# Patient Record
Sex: MALE | Race: OTHER | NOT HISPANIC OR LATINO | URBAN - METROPOLITAN AREA
[De-identification: names, ages, dates, MRNs, and addresses within clinical notes are randomized per-mention and may not be internally consistent; named-entity substitution may affect disease eponyms.]

---

## 2018-01-25 ENCOUNTER — EMERGENCY (EMERGENCY)
Facility: HOSPITAL | Age: 43
LOS: 1 days | Discharge: HOME | End: 2018-01-25
Attending: EMERGENCY MEDICINE | Admitting: EMERGENCY MEDICINE
Payer: MEDICAID

## 2018-01-25 VITALS
TEMPERATURE: 99 F | SYSTOLIC BLOOD PRESSURE: 132 MMHG | OXYGEN SATURATION: 100 % | DIASTOLIC BLOOD PRESSURE: 82 MMHG | RESPIRATION RATE: 20 BRPM | HEART RATE: 73 BPM

## 2018-01-25 PROCEDURE — 99284 EMERGENCY DEPT VISIT MOD MDM: CPT

## 2018-01-25 PROCEDURE — 73610 X-RAY EXAM OF ANKLE: CPT | Mod: 26,RT

## 2018-01-25 RX ORDER — OXYCODONE AND ACETAMINOPHEN 5; 325 MG/1; MG/1
1 TABLET ORAL ONCE
Qty: 0 | Refills: 0 | Status: DISCONTINUED | OUTPATIENT
Start: 2018-01-25 | End: 2018-01-25

## 2018-01-25 RX ADMIN — OXYCODONE AND ACETAMINOPHEN 1 TABLET(S): 5; 325 TABLET ORAL at 12:23

## 2018-01-25 RX ADMIN — OXYCODONE AND ACETAMINOPHEN 1 TABLET(S): 5; 325 TABLET ORAL at 13:35

## 2018-01-25 NOTE — CONSULT NOTE ADULT - SUBJECTIVE AND OBJECTIVE BOX
CC:   HPI:   42yMale     Review of systems: As per HPI, otherwise negative.     PAST MEDICAL & SURGICAL HISTORY:  No pertinent past medical history    Family History: FAMILY HISTORY:      Medications: MEDICATIONS  (STANDING):    MEDICATIONS  (PRN):      T(C): 37.2 (01-25-18 @ 09:55), Max: 37.2 (01-25-18 @ 09:55)  HR: 73 (01-25-18 @ 09:55) (73 - 73)  BP: 132/82 (01-25-18 @ 09:55) (132/82 - 132/82)  RR: 20 (01-25-18 @ 09:55) (20 - 20)  SpO2: 100% (01-25-18 @ 09:55) (100% - 100%)  Wt(kg): --            EXAM:  Awake, Alert and in no acute distress      Imaging      A/P: This is a 42y Male who presents today with   -   -  -    Omar Hewitt MD CC: R leg pain    HPI: 41 y/o Male 10 days s/p ORIF of R ankle at [facility] presenting to the ED with increased pain     Review of systems: As per HPI, otherwise negative.     PAST MEDICAL & SURGICAL HISTORY:  No pertinent past medical history    Family History: FAMILY HISTORY:      Medications: MEDICATIONS  (STANDING):    MEDICATIONS  (PRN):      T(C): 37.2 (01-25-18 @ 09:55), Max: 37.2 (01-25-18 @ 09:55)  HR: 73 (01-25-18 @ 09:55) (73 - 73)  BP: 132/82 (01-25-18 @ 09:55) (132/82 - 132/82)  RR: 20 (01-25-18 @ 09:55) (20 - 20)  SpO2: 100% (01-25-18 @ 09:55) (100% - 100%)  Wt(kg): --            EXAM:  Awake, Alert and in no acute distress      Imaging      A/P: This is a 42y Male who presents today with   -   -  -    Omar Hewitt MD CC: R leg pain    HPI: 41 y/o Male 10 days s/p ORIF of R ankle at [facility] presenting to the ED with increased pain of the RLE. Patient has been NWB in a short leg cast since the operation and has an appointment to see his surgeon on 1/31. Says the pain feels like pressure and thinks it is due to cast being too tight. Denies numbness, paresthesias. Denies fever, chills, nausea, vomiting.     Review of systems: As per HPI, otherwise negative.     PAST MEDICAL & SURGICAL HISTORY:  No pertinent past medical history aside from that stated in HPI    Family History: FAMILY HISTORY:  Noncontributory    T(C): 37.2 (01-25-18 @ 09:55), Max: 37.2 (01-25-18 @ 09:55)  HR: 73 (01-25-18 @ 09:55) (73 - 73)  BP: 132/82 (01-25-18 @ 09:55) (132/82 - 132/82)  RR: 20 (01-25-18 @ 09:55) (20 - 20)  SpO2: 100% (01-25-18 @ 09:55) (100% - 100%)    EXAM:  Awake, Alert and in no acute distress  RLE: in short leg cast, able to wiggle toes, cap refill <2 sec  Cast bivalved and removed  No ankle effusion appreciated  Medial and lateral incisions c/d/i with simple interrupted sutures in place  Minimal tenderness to palpation over both malleoli  Calf non-tender, compartments soft  EHL/GS/TA intact  Murphy/Sa/SP/DP sensation intact  DP palpable, WWP distally    Ankle wrapped with webril and bivalved cast replaced and secured in place with two ACE wraps  Patient stated at pain was relieved when cast was opened    Imaging:  X-ray R ankle  lateral plate with 6 screws (1 across syndesmosis) and medial screw all in place  Fractures adequately reduced  No soft tissue swelling or effusion noted    A/P: This is a 42y Male who presents today with increased RLE pain 10 days s/p ORIF of R ankle trimalleolar fracture at [facility]    - cast bivalved, continue to wear and remain NWB RLE  - f/u with surgeon at [place] as planned on 1/31  - Pain control  - Elevate to reduce swelling    Patient seen and discussed with Dr. Hansa Hewitt MD

## 2018-01-25 NOTE — ED ADULT NURSE REASSESSMENT NOTE - NS ED NURSE REASSESS COMMENT FT1
RW pt right lower leg cast removed by Ortho.  and D/C with instructions uses crutches.     Vidya Wills RN

## 2018-01-25 NOTE — ED PROVIDER NOTE - PHYSICAL EXAMINATION
General: well appearing male in no acute distress   Respiratory: normal work of breathing, lungs clear to auscultation bilaterally   Cardiac: regular rate and rhythm   Abdomen: soft, non-tender, no CVA tenderness   MSK: right lower extremity in cast, no tenderness to palpation of calf, warm, 2sec cap refill   Neuro: A&Ox3, no sensory deficits of lower extremities

## 2018-01-25 NOTE — ED PROVIDER NOTE - PROGRESS NOTE DETAILS
spoke with ortho who requested xrays of foot. will eval patient in ED. - resident Rodríguez Siddiqui patient seen by ortho who did bi-valve. patient reports symptoms have improved. spoke with ortho, patient to follow-up as out patient. resident Rodríguez Siddiqui

## 2018-01-25 NOTE — ED ADULT TRIAGE NOTE - CHIEF COMPLAINT QUOTE
c/o pain to right pt had surgery one week ago at [facility]. for fractured ankle pt ankle in cast c/o pain swelling to ankle

## 2018-01-25 NOTE — ED PROVIDER NOTE - ATTENDING CONTRIBUTION TO CARE
YOLY MENDOZA, MD: 43 yo M, with PSHx of ankle fracture about 1 week ago s/p ORIF by unknown surgeon presenting with right ankle tightness/pain performed at [facility].  Pain has been controlled with oxycodone x 4 days, but ran out and started Motrin which hasn't helped with pain.  Patient denies any calf pain, numbness or tingling and c/o pain over the inner ankle.  Denies chest pain or SOB.   No F/C.  NWB since injury.  YOLY FAITH, ATTENDING NOTE:  Patient is awake and alert and in no acute distress.  Normocephalic/atraumatic.  Auricles are normal.  Neck supple.  Lungs CTAB, no wheeze, no rhonchi,  no rales.  Heart is regular rate and rhythm.  Abdomen is soft, not distended +BS.  Moving all 3 extremities with RLL in a short leg cast.   Neurologically grossly intact, sensation intake proximal and distal to cast.  Cap refill <2s.  Affect is appropriate.   DR. FAITH, ATTENDING MD-  I performed a face to face bedside interview with patient regarding history of present illness, review of symptoms and past medical history. I completed an independent physical exam.  I have discussed patient's plan of care with the resident.   I agree with note as stated above, having amended the EMR as needed to reflect my findings. I have discussed the assessment and plan of care.  This includes during the time I functioned as the attending physician for this patient.

## 2018-01-25 NOTE — ED PROVIDER NOTE - OBJECTIVE STATEMENT
42M, no significant PMH presenting with right ankle tightness. Patient reports he had right ankle surgery performed at [facility] one week ago because of a fracture. Pain has been controlled with oxycodone and Motrin at home. Last night the right ankle began to feel tight and uncomfortable. Symptoms not alleviated with Motrin. Patient denies any calf pain, numbness or tingling of lower extremities, chest pain or difficulty breathing, nausea, vomiting or abdominal pain. No use of blood thinners, has kept foot elevated and using crutches to ambulate.

## 2018-01-25 NOTE — ED PROVIDER NOTE - MEDICAL DECISION MAKING DETAILS
42M, presenting one week after right ankle surgery presenting with right ankle tightness. low concern for DVT as no calf pain. low concern for compartment syndrome. 42M, presenting one week after right ankle surgery presenting with right ankle tightness. low concern for DVT as no calf pain. low concern for compartment syndrome. will consult ortho for imaging vs bi-valve case. will reassess. 42 M, presenting one week after right ankle surgery presenting with right ankle tightness.   Analgesia PRN.  Will consult ortho for imaging vs bi-valve case. will reassess.

## 2018-01-25 NOTE — CONSULT NOTE ADULT - ATTENDING COMMENTS
I agree with the above note on this patient. All pertinent films have been reviewed. Please refer to clinical documentation of the history, physical examinations, data summary, and both assessment and plan as documented above and with which I agree.    Dain Knott MD  Attending Orthopedic Surgeon

## 2018-01-29 ENCOUNTER — EMERGENCY (EMERGENCY)
Facility: HOSPITAL | Age: 43
LOS: 1 days | Discharge: HOME | End: 2018-01-29
Attending: EMERGENCY MEDICINE | Admitting: EMERGENCY MEDICINE
Payer: MEDICAID

## 2018-01-29 VITALS
HEART RATE: 85 BPM | RESPIRATION RATE: 18 BRPM | SYSTOLIC BLOOD PRESSURE: 114 MMHG | OXYGEN SATURATION: 98 % | DIASTOLIC BLOOD PRESSURE: 76 MMHG | TEMPERATURE: 98 F

## 2018-01-29 LAB
BASOPHILS # BLD AUTO: 0.07 K/UL — SIGNIFICANT CHANGE UP (ref 0–0.2)
BASOPHILS NFR BLD AUTO: 0.7 % — SIGNIFICANT CHANGE UP (ref 0–2)
CRP SERPL-MCNC: < 5 MG/L — SIGNIFICANT CHANGE UP
EOSINOPHIL # BLD AUTO: 0.2 K/UL — SIGNIFICANT CHANGE UP (ref 0–0.5)
EOSINOPHIL NFR BLD AUTO: 2 % — SIGNIFICANT CHANGE UP (ref 0–6)
ERYTHROCYTE [SEDIMENTATION RATE] IN BLOOD: 10 MM/HR — SIGNIFICANT CHANGE UP (ref 1–15)
HCT VFR BLD CALC: 47.7 % — SIGNIFICANT CHANGE UP (ref 39–50)
HGB BLD-MCNC: 15.7 G/DL — SIGNIFICANT CHANGE UP (ref 13–17)
IMM GRANULOCYTES # BLD AUTO: 0.04 # — SIGNIFICANT CHANGE UP
IMM GRANULOCYTES NFR BLD AUTO: 0.4 % — SIGNIFICANT CHANGE UP (ref 0–1.5)
LYMPHOCYTES # BLD AUTO: 3.74 K/UL — HIGH (ref 1–3.3)
LYMPHOCYTES # BLD AUTO: 37.5 % — SIGNIFICANT CHANGE UP (ref 13–44)
MCHC RBC-ENTMCNC: 29.8 PG — SIGNIFICANT CHANGE UP (ref 27–34)
MCHC RBC-ENTMCNC: 32.9 % — SIGNIFICANT CHANGE UP (ref 32–36)
MCV RBC AUTO: 90.5 FL — SIGNIFICANT CHANGE UP (ref 80–100)
MONOCYTES # BLD AUTO: 0.66 K/UL — SIGNIFICANT CHANGE UP (ref 0–0.9)
MONOCYTES NFR BLD AUTO: 6.6 % — SIGNIFICANT CHANGE UP (ref 2–14)
NEUTROPHILS # BLD AUTO: 5.26 K/UL — SIGNIFICANT CHANGE UP (ref 1.8–7.4)
NEUTROPHILS NFR BLD AUTO: 52.8 % — SIGNIFICANT CHANGE UP (ref 43–77)
NRBC # FLD: 0 — SIGNIFICANT CHANGE UP
PLATELET # BLD AUTO: 378 K/UL — SIGNIFICANT CHANGE UP (ref 150–400)
PMV BLD: 9.6 FL — SIGNIFICANT CHANGE UP (ref 7–13)
RBC # BLD: 5.27 M/UL — SIGNIFICANT CHANGE UP (ref 4.2–5.8)
RBC # FLD: 12.6 % — SIGNIFICANT CHANGE UP (ref 10.3–14.5)
WBC # BLD: 9.97 K/UL — SIGNIFICANT CHANGE UP (ref 3.8–10.5)
WBC # FLD AUTO: 9.97 K/UL — SIGNIFICANT CHANGE UP (ref 3.8–10.5)

## 2018-01-29 PROCEDURE — 99284 EMERGENCY DEPT VISIT MOD MDM: CPT

## 2018-01-29 PROCEDURE — 73610 X-RAY EXAM OF ANKLE: CPT | Mod: 26,RT

## 2018-01-29 NOTE — CONSULT NOTE ADULT - SUBJECTIVE AND OBJECTIVE BOX
42y Male with incisional pain at sites of R ankle ORIF at [place] POD14. Patient was in the ED on 1/25/18 for right ankle pain where the cast was bivalved and the patient had pain relief. Today the patient comes in because he feels "the staples or pinching his skin". He has some incisional pain and numbness. no drainage from the wound. Pt denies new trauma and says he has been compliant with non weight bearing status. denies any fevers or chills and denies any other orthopedic injuries at this time.    PAST MEDICAL & SURGICAL HISTORY:  No pertinent past medical history    MEDICATIONS  (STANDING):    Allergies    No Known Allergies    Intolerances      Vital Signs Last 24 Hrs  T(C): 36.9 (01-29-18 @ 12:20), Max: 36.9 (01-29-18 @ 12:20)  T(F): 98.4 (01-29-18 @ 12:20), Max: 98.4 (01-29-18 @ 12:20)  HR: 85 (01-29-18 @ 12:20) (85 - 85)  BP: 114/76 (01-29-18 @ 12:20) (114/76 - 114/76)  BP(mean): --  RR: 18 (01-29-18 @ 12:20) (18 - 18)  SpO2: 98% (01-29-18 @ 12:20) (98% - 98%)    Imaging: pending imaging    Physical Exam  Gen: NAD, AAOx3  RLE: medial and lateral incisions intact, normal postoperative changes, staples in place, no bleeding, no drainage from incision, non TTP over medial and lateral malleoli no bony TTP at Knee/Foot/Toes, able to SLR, neg logroll, +EHL/FHL/TA/GS, SILT L3-S1, +DP/PT Pulses, compartments soft, no calf TTP B/L    Secondary Survey: Full ROM of unaffected extremities, SILT globally, compartments soft, no bony TTP over bony prominences, no calf TTP, able to SLR with contralateral leg, no TTP along axial spine    A/P: 42y Male POD14 from R ankle ORIF, bivalved cast on POD10, no with incisional pain and numbness  -pain control  -keep bivalved cast on clean dry intact  -rest ice elevate affected ankle  -NWB on affected ankle  -no acute concern for compartment syndrome or septic joint or surgical site infection  -FU labs and imaging  -no acute ortho intervention needed at this time  -please follow up with primary surgeon, patient states he has appointment on Wednesday  -ortho stable for DC

## 2018-01-29 NOTE — ED PROVIDER NOTE - MEDICAL DECISION MAKING DETAILS
43 y/o M w/ right ankle edema and mild erythema s/p surgery. Plan: Labs, XR, r/o infection and ortho evaluation.

## 2018-01-29 NOTE — ED PROVIDER NOTE - LOWER EXTREMITY EXAM, RIGHT
staples over medial and lateral malleolus, no discharge, appears well healing, anterior ankle w/ warmth, mild erythema and mild edema, pulses normal, sensation intact, no ROM of right ankle, but good movement in toes and knee, no calf tenderness, no calf edema

## 2018-01-29 NOTE — ED PROVIDER NOTE - PROGRESS NOTE DETAILS
SAUL jordan: pt doing well, labs reassuring and xr neg for acute findings. hardware in place. will dc with ortho follow up.

## 2018-01-29 NOTE — ED PROVIDER NOTE - ATTENDING CONTRIBUTION TO CARE
DR. GARNER, UPFRONT ATTENDING MD-  I was the attending upfront in Triage today and I performed the initial face to face bedside interview with patient regarding history of present illness, review of symptoms and past medical history. I completed an independent physical exam.  Since I was the initial provider who evalauted this patient, the history, ROS and examination was documented by me in the note.  I have signed out the follow up of any pending tests (ie. labs and/or radiological studies) to the PA.  I have discussed patient's plan of care and disposition with PA.

## 2018-01-29 NOTE — ED ADULT TRIAGE NOTE - CHIEF COMPLAINT QUOTE
tingling started this am toes warm to touch denies any tingling in toes  had recent adjustment to cast last week

## 2018-01-29 NOTE — ED PROVIDER NOTE - OBJECTIVE STATEMENT
43 y/o M w/ no significant PMhx, presents to the ED c/o right calf tingling since this morning. Pt states he sustained a right ankle fracture on 12/23/17 and had surgery at [facility] 2 weeks ago. Pt was then seen in ED 3 days ago for cast tightness and had cast readjusted. Denies fever, chills or any other complaints. 41 y/o M w/ no significant PMhx, presents to the ED c/o right ankle tingling since this morning. Pt states he sustained a right ankle fracture on 12/23/17 and had surgery at [facility] 2 weeks ago. Pt was then seen in ED 3 days ago for cast tightness w/ associated RLE numbness/tingling and had cast readjusted. Pt reports tingling in RLE since waking up this morning. Denies fever, chills or any other complaints.

## 2018-01-29 NOTE — ED PROVIDER NOTE - PLAN OF CARE
Follow up with your orthopedist. Rest, drink plenty of fluids.  Advance activity as tolerated.  Continue all previously prescribed medications as directed. You can use motrin 600mg every 6-8 hours for pain or fever, and/or Tylenol 650 mg every 4 hours for pain/fever. Follow up with your primary care physician in 48-72 hours- bring copies of your results.  Return to the emergency department for chest pain, shortness of breath, dizziness, or worsening, concerning, or persistent symptoms.

## 2018-01-29 NOTE — ED PROVIDER NOTE - CARE PLAN
Principal Discharge DX:	Cast discomfort  Assessment and plan of treatment:	Follow up with your orthopedist. Rest, drink plenty of fluids.  Advance activity as tolerated.  Continue all previously prescribed medications as directed. You can use motrin 600mg every 6-8 hours for pain or fever, and/or Tylenol 650 mg every 4 hours for pain/fever. Follow up with your primary care physician in 48-72 hours- bring copies of your results.  Return to the emergency department for chest pain, shortness of breath, dizziness, or worsening, concerning, or persistent symptoms.

## 2018-02-20 ENCOUNTER — EMERGENCY (EMERGENCY)
Facility: HOSPITAL | Age: 43
LOS: 1 days | Discharge: HOME | End: 2018-02-20
Attending: EMERGENCY MEDICINE | Admitting: EMERGENCY MEDICINE
Payer: MEDICAID

## 2018-02-20 VITALS
HEART RATE: 108 BPM | SYSTOLIC BLOOD PRESSURE: 139 MMHG | DIASTOLIC BLOOD PRESSURE: 86 MMHG | OXYGEN SATURATION: 97 % | RESPIRATION RATE: 16 BRPM | TEMPERATURE: 99 F

## 2018-02-20 VITALS
OXYGEN SATURATION: 100 % | TEMPERATURE: 99 F | DIASTOLIC BLOOD PRESSURE: 85 MMHG | RESPIRATION RATE: 16 BRPM | HEART RATE: 81 BPM | SYSTOLIC BLOOD PRESSURE: 134 MMHG

## 2018-02-20 LAB
ALBUMIN SERPL ELPH-MCNC: 4.7 G/DL — SIGNIFICANT CHANGE UP (ref 3.3–5)
ALP SERPL-CCNC: 67 U/L — SIGNIFICANT CHANGE UP (ref 40–120)
ALT FLD-CCNC: 75 U/L — HIGH (ref 4–41)
AST SERPL-CCNC: 31 U/L — SIGNIFICANT CHANGE UP (ref 4–40)
BASE EXCESS BLDV CALC-SCNC: 4.1 MMOL/L — SIGNIFICANT CHANGE UP
BASOPHILS # BLD AUTO: 0.05 K/UL — SIGNIFICANT CHANGE UP (ref 0–0.2)
BASOPHILS NFR BLD AUTO: 0.5 % — SIGNIFICANT CHANGE UP (ref 0–2)
BILIRUB SERPL-MCNC: 0.4 MG/DL — SIGNIFICANT CHANGE UP (ref 0.2–1.2)
BLOOD GAS VENOUS - CREATININE: 0.76 MG/DL — SIGNIFICANT CHANGE UP (ref 0.5–1.3)
BUN SERPL-MCNC: 15 MG/DL — SIGNIFICANT CHANGE UP (ref 7–23)
CALCIUM SERPL-MCNC: 9.5 MG/DL — SIGNIFICANT CHANGE UP (ref 8.4–10.5)
CHLORIDE BLDV-SCNC: 106 MMOL/L — SIGNIFICANT CHANGE UP (ref 96–108)
CHLORIDE SERPL-SCNC: 99 MMOL/L — SIGNIFICANT CHANGE UP (ref 98–107)
CO2 SERPL-SCNC: 25 MMOL/L — SIGNIFICANT CHANGE UP (ref 22–31)
CREAT SERPL-MCNC: 0.9 MG/DL — SIGNIFICANT CHANGE UP (ref 0.5–1.3)
CRP SERPL-MCNC: < 5 MG/L — SIGNIFICANT CHANGE UP
EOSINOPHIL # BLD AUTO: 0.22 K/UL — SIGNIFICANT CHANGE UP (ref 0–0.5)
EOSINOPHIL NFR BLD AUTO: 2.3 % — SIGNIFICANT CHANGE UP (ref 0–6)
ERYTHROCYTE [SEDIMENTATION RATE] IN BLOOD: 3 MM/HR — SIGNIFICANT CHANGE UP (ref 1–15)
GAS PNL BLDV: 134 MMOL/L — LOW (ref 136–146)
GLUCOSE BLDV-MCNC: 111 — HIGH (ref 70–99)
GLUCOSE SERPL-MCNC: 99 MG/DL — SIGNIFICANT CHANGE UP (ref 70–99)
HCO3 BLDV-SCNC: 27 MMOL/L — SIGNIFICANT CHANGE UP (ref 20–27)
HCT VFR BLD CALC: 48.5 % — SIGNIFICANT CHANGE UP (ref 39–50)
HCT VFR BLDV CALC: 50.5 % — SIGNIFICANT CHANGE UP (ref 39–51)
HGB BLD-MCNC: 16.3 G/DL — SIGNIFICANT CHANGE UP (ref 13–17)
HGB BLDV-MCNC: 16.5 G/DL — SIGNIFICANT CHANGE UP (ref 13–17)
IMM GRANULOCYTES # BLD AUTO: 0.06 # — SIGNIFICANT CHANGE UP
IMM GRANULOCYTES NFR BLD AUTO: 0.6 % — SIGNIFICANT CHANGE UP (ref 0–1.5)
LACTATE BLDV-MCNC: 2.1 MMOL/L — HIGH (ref 0.5–2)
LYMPHOCYTES # BLD AUTO: 3.27 K/UL — SIGNIFICANT CHANGE UP (ref 1–3.3)
LYMPHOCYTES # BLD AUTO: 34.1 % — SIGNIFICANT CHANGE UP (ref 13–44)
MCHC RBC-ENTMCNC: 29.7 PG — SIGNIFICANT CHANGE UP (ref 27–34)
MCHC RBC-ENTMCNC: 33.6 % — SIGNIFICANT CHANGE UP (ref 32–36)
MCV RBC AUTO: 88.5 FL — SIGNIFICANT CHANGE UP (ref 80–100)
MONOCYTES # BLD AUTO: 0.77 K/UL — SIGNIFICANT CHANGE UP (ref 0–0.9)
MONOCYTES NFR BLD AUTO: 8 % — SIGNIFICANT CHANGE UP (ref 2–14)
NEUTROPHILS # BLD AUTO: 5.22 K/UL — SIGNIFICANT CHANGE UP (ref 1.8–7.4)
NEUTROPHILS NFR BLD AUTO: 54.5 % — SIGNIFICANT CHANGE UP (ref 43–77)
NRBC # FLD: 0 — SIGNIFICANT CHANGE UP
PCO2 BLDV: 46 MMHG — SIGNIFICANT CHANGE UP (ref 41–51)
PH BLDV: 7.41 PH — SIGNIFICANT CHANGE UP (ref 7.32–7.43)
PLATELET # BLD AUTO: 241 K/UL — SIGNIFICANT CHANGE UP (ref 150–400)
PMV BLD: 10 FL — SIGNIFICANT CHANGE UP (ref 7–13)
PO2 BLDV: 50 MMHG — HIGH (ref 35–40)
POTASSIUM BLDV-SCNC: 4.1 MMOL/L — SIGNIFICANT CHANGE UP (ref 3.4–4.5)
POTASSIUM SERPL-MCNC: 4.4 MMOL/L — SIGNIFICANT CHANGE UP (ref 3.5–5.3)
POTASSIUM SERPL-SCNC: 4.4 MMOL/L — SIGNIFICANT CHANGE UP (ref 3.5–5.3)
PROT SERPL-MCNC: 7.8 G/DL — SIGNIFICANT CHANGE UP (ref 6–8.3)
RBC # BLD: 5.48 M/UL — SIGNIFICANT CHANGE UP (ref 4.2–5.8)
RBC # FLD: 13 % — SIGNIFICANT CHANGE UP (ref 10.3–14.5)
SAO2 % BLDV: 83.4 % — SIGNIFICANT CHANGE UP (ref 60–85)
SODIUM SERPL-SCNC: 140 MMOL/L — SIGNIFICANT CHANGE UP (ref 135–145)
WBC # BLD: 9.59 K/UL — SIGNIFICANT CHANGE UP (ref 3.8–10.5)
WBC # FLD AUTO: 9.59 K/UL — SIGNIFICANT CHANGE UP (ref 3.8–10.5)

## 2018-02-20 PROCEDURE — 99285 EMERGENCY DEPT VISIT HI MDM: CPT

## 2018-02-20 PROCEDURE — 73610 X-RAY EXAM OF ANKLE: CPT | Mod: 26,RT

## 2018-02-20 RX ORDER — SODIUM CHLORIDE 9 MG/ML
1000 INJECTION INTRAMUSCULAR; INTRAVENOUS; SUBCUTANEOUS ONCE
Qty: 0 | Refills: 0 | Status: COMPLETED | OUTPATIENT
Start: 2018-02-20 | End: 2018-02-20

## 2018-02-20 RX ORDER — CEPHALEXIN 500 MG
1 CAPSULE ORAL
Qty: 28 | Refills: 0 | OUTPATIENT
Start: 2018-02-20 | End: 2018-03-05

## 2018-02-20 RX ADMIN — SODIUM CHLORIDE 1000 MILLILITER(S): 9 INJECTION INTRAMUSCULAR; INTRAVENOUS; SUBCUTANEOUS at 15:13

## 2018-02-20 NOTE — ED PROVIDER NOTE - CARE PLAN
Assessment and plan of treatment:	Rest, drink plenty of fluids.  Advance activity as tolerated.  Continue all previously prescribed medications as directed. Take Keflex twice a day as directed until you see your surgeon. Call to move your appointment up and see your Surgeon this week. Follow up with your primary care physician in 48-72 hours- bring copies of your results.  Return to the Emergency Department for fevers, pain, increased swelling, drainage, worsening or persistent symptoms OR ANY NEW OR CONCERNING SYMPTOMS. Principal Discharge DX:	Postoperative wound dehiscence, initial encounter  Assessment and plan of treatment:	Rest, drink plenty of fluids.  Advance activity as tolerated.  Continue all previously prescribed medications as directed. Take Keflex twice a day as directed until you see your surgeon. Call to move your appointment up and see your Surgeon this week. Follow up with your primary care physician in 48-72 hours- bring copies of your results.  Return to the Emergency Department for fevers, pain, increased swelling, drainage, worsening or persistent symptoms OR ANY NEW OR CONCERNING SYMPTOMS.

## 2018-02-20 NOTE — ED PROVIDER NOTE - ATTENDING CONTRIBUTION TO CARE
42M p/w R medial ankle surgical wound dehiscence and drainage x 2 days, notes clear discharge from inner R ankle.  Had surgery 1/16/18 for trimal fx; is wearing CAM boot and crutches, sutures removed 1/30/18.  No fever, able to move joint.  On exam wound dehiscence, no discharge at present, wound/ankle is not hot and no redness.  Get rectal temp, labs, xrays, ortho consult.  Unlikely infection, most likely simple wound dehiscence.  If all acceptable, discharge home, follow up with your Doctor within 1 week.  VS:  unremarkable except mild tachycardia    GEN - NAD; well appearing; A+O x3   HEAD - NC/AT     ENT - PEERL, EOMI, mucous membranes  moist , no discharge      NECK: Neck supple, non-tender without lymphadenopathy, no masses, no JVD  PULM - CTA b/l,  symmetric breath sounds  COR -  normal heart sounds    ABD - , ND, NT, soft, no guarding, no rebound, no masses    BACK - no CVA tenderness, nontender spine     EXTREMS - no edema, no deformity, warm and well perfused; except for R medial ankle with 5cm vertical incision with 1cm wound gaping and slight clear wetness at base.  No redness, warmth, nontender and able to flex/ex ankle, distal N/V/T intact  SKIN - no rash or bruising      NEUROLOGIC - alert, CN 2-12 intact, sensation nl, motor 5/5 RUE/LUE/RLE/LLE.

## 2018-02-20 NOTE — CONSULT NOTE ADULT - ATTENDING COMMENTS
I agree with the above note on this patient. All pertinent films have been reviewed. Please refer to clinical documentation of the history, physical examinations, data summary, and both assessment and plan as documented above.    This is a 43 yo male s/p R AFx [facility]. Has medial wound dehiscence without exposed hardware per report above. Recommend local wound care per above and prophylactic abx. Follow up with primary surgeon within 3-5 days.     Dain Knott MD  Attending Orthopedic Surgeon

## 2018-02-20 NOTE — CONSULT NOTE ADULT - SUBJECTIVE AND OBJECTIVE BOX
43 yo male s/p a right ankle ORIF on 1/16/2018 at [facility], presents with right ankle surgical wound pain and questionable mild drainage.  Patient states he has a post op appointment with his primary surgeon on 1/30 in which he had the staples/sutures removed (patient forgets surgeons name).  Patients next appointment is on 3/5/2018 but when he started to have mild incision pain yesterday, he came to [facility] because it was closer to his home.  Patient states he noticed a scab on the medial mal and when the scab fell off yesterday, there was mild clear drainage and skin irritation/pain.  Denies any CO, SOB, N/V/D, HA or dizziness, denies any fevers or chills. Patient has been compliant with NON weight bearing and CAM boot. 43 yo male s/p a right ankle ORIF on 1/16/2018 at [facility], presents with right ankle surgical wound pain and questionable mild drainage.  Patient states he had a post op appointment with his primary surgeon on 1/30 in which he had the staples/sutures removed (patient forgets surgeons name) without any complications.  Patient states he had a scab on the medial wound after the sutures/staples removed and yesterday the scab fell off.  He then had medial surgical wound irritation and mild clear drainage.  Patients next appointment is on 3/5/2018 but came to [facility] because it was closer to his home. Denies any CO, SOB, N/V/D, HA or dizziness, denies any fevers or chills. Patient states he has been compliant with NON weight bearing and has been wearing a CAM boot.

## 2018-02-20 NOTE — CONSULT NOTE ADULT - EXTREMITIES COMMENTS
Right ankle: +mild wound dehiscences distal medial mal (2.5-3 cm long and <0.5 cm wide) with no purulent drainage, erythema or signs of infection, lateral surgical wound C/D/I, no dehiscence Right ankle: +mild wound dehiscences distal medial mal (2.5-3 cm long and <0.5 cm wide).  There is no hardware exposed, no purulent drainage, erythema, warmth or signs of infection.  Lateral surgical wound C/D/I, no dehiscence, warmth or signs of infection.

## 2018-02-20 NOTE — ED PROVIDER NOTE - MEDICAL DECISION MAKING DETAILS
42 y.o male no pmhx s/p trimalleolar repair at [place] on 1/16/18  ( pt unsure of surgeons name) presents with "drainage" from his medial incision sight since 2 days. No systemic symptoms, afebrile here. Wound appears to be healing well , no pain, ankle with FROM. pt with hardware so will check basics, vbg, esr, crp, xray ankle.

## 2018-02-20 NOTE — ED PROVIDER NOTE - PHYSICAL EXAMINATION
R ankle with FROM   no erythematous, warmth or signs of overlying infection  small incision site with well healing tissue  no active purulent drainge  no calf tenderness, compartments soft, moving all digits, pulses intact, cap refill normal  neurovascularly intact

## 2018-02-20 NOTE — ED PROVIDER NOTE - PLAN OF CARE
Rest, drink plenty of fluids.  Advance activity as tolerated.  Continue all previously prescribed medications as directed. Take Keflex twice a day as directed until you see your surgeon. Call to move your appointment up and see your Surgeon this week. Follow up with your primary care physician in 48-72 hours- bring copies of your results.  Return to the Emergency Department for fevers, pain, increased swelling, drainage, worsening or persistent symptoms OR ANY NEW OR CONCERNING SYMPTOMS.

## 2018-02-20 NOTE — ED ADULT TRIAGE NOTE - CHIEF COMPLAINT QUOTE
Pt. c/o open wound draining yellow discharge on medial side of right ankle x 2 days. States he had ankle surgery for fx 1/16- sutures were removed from this area on 1/24. Denies pain or fever. Leg brace on and using crutches for ambulation. No pmhx.

## 2018-02-20 NOTE — CONSULT NOTE ADULT - ASSESSMENT
43 yo male with wound breakdown s/p right ankle ORIF  1. Pain management  2. NWB RLE, elevate and ice; remain in CAM boot at all times  3. Bacitracin, guaze, ABD and ace wrap applied to RLE  4. Keflex until f/u with primary surgeon  5. No signs of infected hardware/septic ankle at this time as patient has painless ROM, no purulent discharge, erythema or fever.  Patient to call primary surgeon for a follow up appointment within 3-5 days  6. No orthopedic intervention at this time  7. Call primary surgeon with any questions or concerns 41 yo male with wound breakdown s/p right ankle ORIF  1. Pain management  2. NWB RLE, elevate and ice; remain in CAM boot at all times  3. Bacitracin, guaze, ABD and ace wrap applied to RLE  4. Keflex until f/u with primary surgeon  5. No signs of infected hardware/septic ankle at this time as patient has painless ROM, no purulent discharge, erythema or fever.  Patient to call primary surgeon for a follow up appointment within this week  6. No orthopedic intervention at this time  7. Call primary surgeon with any questions or concerns

## 2018-02-20 NOTE — ED PROVIDER NOTE - PROGRESS NOTE DETAILS
SAUL Hoang: Pt rectally afebrile, labs wnl. seen by ortho, no acute intervention at this time.  Bacitracin, guaze, ABD and ace wrap applied to RLE, rec Keflex until they see his surgeon and [place] and to call and move appt up. Had long discussion the importance of calling to move appt up and to make sure he follows up with Surgeon. Pt agrees with plan, ready or discharge home, strict return precautions given.

## 2018-02-20 NOTE — ED PROVIDER NOTE - OBJECTIVE STATEMENT
42 y.o male no pmhx s/p trimalleolar repair at [place] ( pt unsure of surgeons name) here with 42 y.o male no pmhx s/p trimalleolar repair at [place] on 1/16/18  ( pt unsure of surgeons name) presents with "drainage" from his medial incision sight since 2 days. States he feels the wound is wet when he touches it, no purulent drainage or leakage. Pt states he had his staples removed on 1/30/18 and states surgeon said wound looked fine, he was given soft boot and crutches has f/u appt on March 5th with his surgeon. Denies any pain , has full range of motion of the ankle. Denies any systemic symptoms no fevers, chills, chest pain, SOB, cough, n/v/d, abdominal pain, numbness, tingling, weakness.

## 2018-02-21 LAB
SPECIMEN SOURCE: SIGNIFICANT CHANGE UP
SPECIMEN SOURCE: SIGNIFICANT CHANGE UP

## 2018-02-23 ENCOUNTER — EMERGENCY (EMERGENCY)
Facility: HOSPITAL | Age: 43
LOS: 1 days | Discharge: HOME | End: 2018-02-23
Attending: EMERGENCY MEDICINE | Admitting: EMERGENCY MEDICINE
Payer: MEDICAID

## 2018-02-23 VITALS
OXYGEN SATURATION: 100 % | TEMPERATURE: 99 F | DIASTOLIC BLOOD PRESSURE: 100 MMHG | HEART RATE: 77 BPM | RESPIRATION RATE: 18 BRPM | SYSTOLIC BLOOD PRESSURE: 141 MMHG

## 2018-02-23 DIAGNOSIS — S82.899A OTHER FRACTURE OF UNSPECIFIED LOWER LEG, INITIAL ENCOUNTER FOR CLOSED FRACTURE: Chronic | ICD-10-CM

## 2018-02-23 LAB
ALBUMIN SERPL ELPH-MCNC: 4.5 G/DL — SIGNIFICANT CHANGE UP (ref 3.3–5)
ALP SERPL-CCNC: 63 U/L — SIGNIFICANT CHANGE UP (ref 40–120)
ALT FLD-CCNC: 66 U/L — HIGH (ref 4–41)
APTT BLD: 28.5 SEC — SIGNIFICANT CHANGE UP (ref 27.5–37.4)
AST SERPL-CCNC: 25 U/L — SIGNIFICANT CHANGE UP (ref 4–40)
BASOPHILS # BLD AUTO: 0.04 K/UL — SIGNIFICANT CHANGE UP (ref 0–0.2)
BASOPHILS NFR BLD AUTO: 0.3 % — SIGNIFICANT CHANGE UP (ref 0–2)
BILIRUB SERPL-MCNC: 0.5 MG/DL — SIGNIFICANT CHANGE UP (ref 0.2–1.2)
BUN SERPL-MCNC: 12 MG/DL — SIGNIFICANT CHANGE UP (ref 7–23)
CALCIUM SERPL-MCNC: 9 MG/DL — SIGNIFICANT CHANGE UP (ref 8.4–10.5)
CHLORIDE SERPL-SCNC: 101 MMOL/L — SIGNIFICANT CHANGE UP (ref 98–107)
CK MB BLD-MCNC: 1.1 — SIGNIFICANT CHANGE UP (ref 0–2.5)
CK MB BLD-MCNC: 1.7 NG/ML — SIGNIFICANT CHANGE UP (ref 1–6.6)
CK SERPL-CCNC: 151 U/L — SIGNIFICANT CHANGE UP (ref 30–200)
CO2 SERPL-SCNC: 24 MMOL/L — SIGNIFICANT CHANGE UP (ref 22–31)
CREAT SERPL-MCNC: 0.93 MG/DL — SIGNIFICANT CHANGE UP (ref 0.5–1.3)
D DIMER BLD IA.RAPID-MCNC: < 150 NG/ML — SIGNIFICANT CHANGE UP
EOSINOPHIL # BLD AUTO: 0.12 K/UL — SIGNIFICANT CHANGE UP (ref 0–0.5)
EOSINOPHIL NFR BLD AUTO: 1 % — SIGNIFICANT CHANGE UP (ref 0–6)
GLUCOSE SERPL-MCNC: 119 MG/DL — HIGH (ref 70–99)
HCT VFR BLD CALC: 46.7 % — SIGNIFICANT CHANGE UP (ref 39–50)
HGB BLD-MCNC: 15.8 G/DL — SIGNIFICANT CHANGE UP (ref 13–17)
IMM GRANULOCYTES # BLD AUTO: 0.05 # — SIGNIFICANT CHANGE UP
IMM GRANULOCYTES NFR BLD AUTO: 0.4 % — SIGNIFICANT CHANGE UP (ref 0–1.5)
INR BLD: 0.9 — SIGNIFICANT CHANGE UP (ref 0.88–1.17)
LYMPHOCYTES # BLD AUTO: 3.96 K/UL — HIGH (ref 1–3.3)
LYMPHOCYTES # BLD AUTO: 33.2 % — SIGNIFICANT CHANGE UP (ref 13–44)
MCHC RBC-ENTMCNC: 30.9 PG — SIGNIFICANT CHANGE UP (ref 27–34)
MCHC RBC-ENTMCNC: 33.8 % — SIGNIFICANT CHANGE UP (ref 32–36)
MCV RBC AUTO: 91.2 FL — SIGNIFICANT CHANGE UP (ref 80–100)
MONOCYTES # BLD AUTO: 0.62 K/UL — SIGNIFICANT CHANGE UP (ref 0–0.9)
MONOCYTES NFR BLD AUTO: 5.2 % — SIGNIFICANT CHANGE UP (ref 2–14)
NEUTROPHILS # BLD AUTO: 7.12 K/UL — SIGNIFICANT CHANGE UP (ref 1.8–7.4)
NEUTROPHILS NFR BLD AUTO: 59.9 % — SIGNIFICANT CHANGE UP (ref 43–77)
NRBC # FLD: 0 — SIGNIFICANT CHANGE UP
PLATELET # BLD AUTO: 241 K/UL — SIGNIFICANT CHANGE UP (ref 150–400)
PMV BLD: 9.7 FL — SIGNIFICANT CHANGE UP (ref 7–13)
POTASSIUM SERPL-MCNC: 4.6 MMOL/L — SIGNIFICANT CHANGE UP (ref 3.5–5.3)
POTASSIUM SERPL-SCNC: 4.6 MMOL/L — SIGNIFICANT CHANGE UP (ref 3.5–5.3)
PROT SERPL-MCNC: 7.4 G/DL — SIGNIFICANT CHANGE UP (ref 6–8.3)
PROTHROM AB SERPL-ACNC: 10.3 SEC — SIGNIFICANT CHANGE UP (ref 9.8–13.1)
RBC # BLD: 5.12 M/UL — SIGNIFICANT CHANGE UP (ref 4.2–5.8)
RBC # FLD: 12.9 % — SIGNIFICANT CHANGE UP (ref 10.3–14.5)
SODIUM SERPL-SCNC: 139 MMOL/L — SIGNIFICANT CHANGE UP (ref 135–145)
TROPONIN T SERPL-MCNC: < 0.06 NG/ML — SIGNIFICANT CHANGE UP (ref 0–0.06)
WBC # BLD: 11.91 K/UL — HIGH (ref 3.8–10.5)
WBC # FLD AUTO: 11.91 K/UL — HIGH (ref 3.8–10.5)

## 2018-02-23 PROCEDURE — 99284 EMERGENCY DEPT VISIT MOD MDM: CPT | Mod: 25

## 2018-02-23 PROCEDURE — 71046 X-RAY EXAM CHEST 2 VIEWS: CPT | Mod: 26

## 2018-02-23 PROCEDURE — 93010 ELECTROCARDIOGRAM REPORT: CPT

## 2018-02-23 NOTE — ED PROVIDER NOTE - PROGRESS NOTE DETAILS
Gollogly: d-dimer negative. Will d/c. Gave follow up instructions and return precautions. Pt also reports itching/irritation to scrotum x several months. Pt is sexually active with 1 partner, no penile discharge, no scrotal pain, no hx STI.  exam performed (chaperone: Watson Elena RN): no erythema/rash/skin breakdown/penile lesions. Pt washes daily. Likely irritant dermatitis, instructed pt to change soaps, follow up with PMD, return for worsening symptoms.

## 2018-02-23 NOTE — ED PROVIDER NOTE - ATTENDING CONTRIBUTION TO CARE
MD Orellana:  I performed a face to face bedside interview with patient regarding history of present illness, review of symptoms and past medical history. I completed an independent physical exam(documented below).  I have discussed patient's plan of care with resident.   I agree with note as stated above, having amended the EMR as needed to reflect my findings. I have discussed the assessment and plan of care.  This includes during the time I functioned as the attending physician for this patient.  PE:  Gen: Alert, NAD  Head: NC, AT,  EOMI, normal lids/conjunctiva  ENT:  normal hearing, patent oropharynx without erythema/exudate, uvula midline  Neck: +supple, no tenderness/meningismus/JVD, +Trachea midline  Chest: no chest wall tenderness, equal chest rise  Pulm: Bilateral BS, normal resp effort, no wheeze/stridor/retractions  CV: RRR, no M/R/G, +dist pulses  Abd: soft, NT/ND, +BS, no hepatosplenomegaly  Rectal: deferred  Mskel: R ankle in ortho boot, surgical scar healing well, no erythema or significant edema of right leg/foot  Skin: no rash  Neuro: AAOx3, no sensory/motor deficits, CN 2-12 intact   MDM:  41yo M s/p recent ORIF for R trimalleolar fx, seen in ED twice since then (first for tingling of ankle, then for drainage/partial wound dehiscence of surgical wound), returns for dry mouth and intermittent "waves" of sob since ystdy. Denies cp, fever, chills, n/v, recent smoking or etoh use, or new LE edema. States he has actually been more active with walking around (with crutches) over the last week. Low suspicion for PE, will dimer. On ROS, pt also c/o penile itching and swelling. Resident to examine w/ chaperone for r/o balanitis vs STI.

## 2018-02-23 NOTE — ED PROVIDER NOTE - OBJECTIVE STATEMENT
42M h/o surgery on 1/16/18 (5 weeks ago) for R ankle fx p/w intermittent episodes of SOB since last night, a/w nausea and dry. mouth. Pt is wearing a boot and has been using crutches. No leg pain swelling or hx DVT/PE. No fever, cough, chest pain, fever, or vomiting. Pt seen here recently for drainage from wound, denies further drainage or swelling/pain related to wound, is currently taking abx.

## 2018-02-23 NOTE — ED PROVIDER NOTE - SKIN, MLM
Skin normal color for race, warm, dry and intact. Well-healing surgical scars over medial and lateral malleoli of R ankle, no drainage or cellulitis.

## 2018-02-23 NOTE — ED ADULT TRIAGE NOTE - CHIEF COMPLAINT QUOTE
Brought in by ems from home for sob since yesterday. Respirations even and non labored. 02 sat 100%. Denies chest pain, dizziness, nausea, vomiting. Co dry mouth even after drinking water. Pt had right ankle surgery on 1/16/18, pt has boot on. Currently taking cephalexin sp surgery. Denies pmh.

## 2018-02-23 NOTE — ED PROVIDER NOTE - MEDICAL DECISION MAKING DETAILS
Pt with intermittent SOB x 1 day. Concern for PE (hx surgery), less likely ACS. Plan: ekg, cxr, labs, troponin, d-dimer (CTPA vs LE duplex if positive).

## 2018-02-23 NOTE — ED PROVIDER NOTE - CARE PLAN
Principal Discharge DX:	Shortness of breath  Assessment and plan of treatment:	-- Follow up with your primary doctor within 2-3 days. Bring a copy of your results from today and discuss them with your doctor.   -- Return to ER immediately for new or worsening symptoms, any urgent issues, or for any concerns.

## 2018-02-25 LAB
BACTERIA BLD CULT: SIGNIFICANT CHANGE UP
BACTERIA BLD CULT: SIGNIFICANT CHANGE UP

## 2021-12-06 ENCOUNTER — EMERGENCY (EMERGENCY)
Facility: HOSPITAL | Age: 46
LOS: 0 days | Discharge: HOME | End: 2021-12-06
Attending: STUDENT IN AN ORGANIZED HEALTH CARE EDUCATION/TRAINING PROGRAM
Payer: MEDICAID

## 2021-12-06 VITALS
TEMPERATURE: 98 F | RESPIRATION RATE: 20 BRPM | OXYGEN SATURATION: 94 % | HEART RATE: 85 BPM | DIASTOLIC BLOOD PRESSURE: 76 MMHG | SYSTOLIC BLOOD PRESSURE: 132 MMHG

## 2021-12-06 VITALS
HEART RATE: 92 BPM | TEMPERATURE: 98 F | DIASTOLIC BLOOD PRESSURE: 105 MMHG | HEIGHT: 67 IN | RESPIRATION RATE: 17 BRPM | OXYGEN SATURATION: 90 % | SYSTOLIC BLOOD PRESSURE: 156 MMHG | WEIGHT: 250 LBS
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
## 2021-12-06 DIAGNOSIS — R42 DIZZINESS AND GIDDINESS: ICD-10-CM

## 2021-12-06 DIAGNOSIS — F12.10 CANNABIS ABUSE, UNCOMPLICATED: ICD-10-CM
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
## 2021-12-06 DIAGNOSIS — R11.2 NAUSEA WITH VOMITING, UNSPECIFIED: ICD-10-CM

## 2021-12-06 DIAGNOSIS — S82.899A OTHER FRACTURE OF UNSPECIFIED LOWER LEG, INITIAL ENCOUNTER FOR CLOSED FRACTURE: Chronic | ICD-10-CM

## 2021-12-06 DIAGNOSIS — T78.1XXA OTHER ADVERSE FOOD REACTIONS, NOT ELSEWHERE CLASSIFIED, INITIAL ENCOUNTER: ICD-10-CM

## 2021-12-06 LAB
ALBUMIN SERPL ELPH-MCNC: 3.1 G/DL — LOW (ref 3.3–5)
ALP SERPL-CCNC: 50 U/L — SIGNIFICANT CHANGE UP (ref 40–120)
ALT FLD-CCNC: 60 U/L — SIGNIFICANT CHANGE UP (ref 12–78)
ANION GAP SERPL CALC-SCNC: 6 MMOL/L — SIGNIFICANT CHANGE UP (ref 5–17)
APAP SERPL-MCNC: < 2 UG/ML (ref 10–30)
AST SERPL-CCNC: 24 U/L — SIGNIFICANT CHANGE UP (ref 15–37)
BASOPHILS # BLD AUTO: 0.05 K/UL — SIGNIFICANT CHANGE UP (ref 0–0.2)
BASOPHILS NFR BLD AUTO: 0.4 % — SIGNIFICANT CHANGE UP (ref 0–2)
BILIRUB SERPL-MCNC: 0.2 MG/DL — SIGNIFICANT CHANGE UP (ref 0.2–1.2)
BUN SERPL-MCNC: 17 MG/DL — SIGNIFICANT CHANGE UP (ref 7–23)
CALCIUM SERPL-MCNC: 8.3 MG/DL — LOW (ref 8.5–10.1)
CHLORIDE SERPL-SCNC: 104 MMOL/L — SIGNIFICANT CHANGE UP (ref 96–108)
CO2 SERPL-SCNC: 27 MMOL/L — SIGNIFICANT CHANGE UP (ref 22–31)
CREAT SERPL-MCNC: 1.09 MG/DL — SIGNIFICANT CHANGE UP (ref 0.5–1.3)
EOSINOPHIL # BLD AUTO: 0.22 K/UL — SIGNIFICANT CHANGE UP (ref 0–0.5)
EOSINOPHIL NFR BLD AUTO: 1.9 % — SIGNIFICANT CHANGE UP (ref 0–6)
GLUCOSE SERPL-MCNC: 139 MG/DL — HIGH (ref 70–99)
HCT VFR BLD CALC: 45.7 % — SIGNIFICANT CHANGE UP (ref 39–50)
HGB BLD-MCNC: 15.2 G/DL — SIGNIFICANT CHANGE UP (ref 13–17)
IMM GRANULOCYTES NFR BLD AUTO: 0.7 % — SIGNIFICANT CHANGE UP (ref 0–1.5)
LYMPHOCYTES # BLD AUTO: 3.87 K/UL — HIGH (ref 1–3.3)
LYMPHOCYTES # BLD AUTO: 33.5 % — SIGNIFICANT CHANGE UP (ref 13–44)
MCHC RBC-ENTMCNC: 30.1 PG — SIGNIFICANT CHANGE UP (ref 27–34)
MCHC RBC-ENTMCNC: 33.3 GM/DL — SIGNIFICANT CHANGE UP (ref 32–36)
MCV RBC AUTO: 90.5 FL — SIGNIFICANT CHANGE UP (ref 80–100)
MONOCYTES # BLD AUTO: 0.78 K/UL — SIGNIFICANT CHANGE UP (ref 0–0.9)
MONOCYTES NFR BLD AUTO: 6.7 % — SIGNIFICANT CHANGE UP (ref 2–14)
NEUTROPHILS # BLD AUTO: 6.56 K/UL — SIGNIFICANT CHANGE UP (ref 1.8–7.4)
NEUTROPHILS NFR BLD AUTO: 56.8 % — SIGNIFICANT CHANGE UP (ref 43–77)
NRBC # BLD: 0 /100 WBCS — SIGNIFICANT CHANGE UP (ref 0–0)
PLATELET # BLD AUTO: 254 K/UL — SIGNIFICANT CHANGE UP (ref 150–400)
POTASSIUM SERPL-MCNC: 4.1 MMOL/L — SIGNIFICANT CHANGE UP (ref 3.5–5.3)
POTASSIUM SERPL-SCNC: 4.1 MMOL/L — SIGNIFICANT CHANGE UP (ref 3.5–5.3)
PROT SERPL-MCNC: 7.1 GM/DL — SIGNIFICANT CHANGE UP (ref 6–8.3)
RBC # BLD: 5.05 M/UL — SIGNIFICANT CHANGE UP (ref 4.2–5.8)
RBC # FLD: 13.7 % — SIGNIFICANT CHANGE UP (ref 10.3–14.5)
SALICYLATES SERPL-MCNC: <1.7 MG/DL — LOW (ref 2.8–20)
SODIUM SERPL-SCNC: 137 MMOL/L — SIGNIFICANT CHANGE UP (ref 135–145)
WBC # BLD: 11.56 K/UL — HIGH (ref 3.8–10.5)
WBC # FLD AUTO: 11.56 K/UL — HIGH (ref 3.8–10.5)

## 2021-12-06 PROCEDURE — 71045 X-RAY EXAM CHEST 1 VIEW: CPT | Mod: 26

## 2021-12-06 PROCEDURE — 99285 EMERGENCY DEPT VISIT HI MDM: CPT

## 2021-12-06 PROCEDURE — 93010 ELECTROCARDIOGRAM REPORT: CPT

## 2021-12-06 RX ORDER — SODIUM CHLORIDE 9 MG/ML
1000 INJECTION INTRAMUSCULAR; INTRAVENOUS; SUBCUTANEOUS ONCE
Refills: 0 | Status: COMPLETED | OUTPATIENT
Start: 2021-12-06 | End: 2021-12-06

## 2021-12-06 RX ORDER — ONDANSETRON 8 MG/1
8 TABLET, FILM COATED ORAL ONCE
Refills: 0 | Status: COMPLETED | OUTPATIENT
Start: 2021-12-06 | End: 2021-12-06

## 2021-12-06 RX ORDER — PANTOPRAZOLE SODIUM 20 MG/1
40 TABLET, DELAYED RELEASE ORAL ONCE
Refills: 0 | Status: COMPLETED | OUTPATIENT
Start: 2021-12-06 | End: 2021-12-06

## 2021-12-06 RX ADMIN — SODIUM CHLORIDE 1000 MILLILITER(S): 9 INJECTION INTRAMUSCULAR; INTRAVENOUS; SUBCUTANEOUS at 04:51

## 2021-12-06 RX ADMIN — PANTOPRAZOLE SODIUM 40 MILLIGRAM(S): 20 TABLET, DELAYED RELEASE ORAL at 03:23

## 2021-12-06 RX ADMIN — SODIUM CHLORIDE 1000 MILLILITER(S): 9 INJECTION INTRAMUSCULAR; INTRAVENOUS; SUBCUTANEOUS at 03:24

## 2021-12-06 RX ADMIN — ONDANSETRON 8 MILLIGRAM(S): 8 TABLET, FILM COATED ORAL at 03:24

## 2021-12-06 NOTE — ED ADULT NURSE NOTE - ALCOHOL PRE SCREEN (AUDIT - C)
Statement Selected
necklace and belly ring/Cell Phone/PDA (specify)/Jewelry/Money (specify)/Clothing

## 2021-12-06 NOTE — ED PROVIDER NOTE - PATIENT PORTAL LINK FT
You can access the FollowMyHealth Patient Portal offered by [facility] by registering at the following website: [email or web address]. By joining [facility]’s FollowMyHealth portal, you will also be able to view your health information using other applications (apps) compatible with our system.

## 2021-12-06 NOTE — ED PROVIDER NOTE - OBJECTIVE STATEMENT
46 year old male 46 year old male presents today c/o eating a cookie given to him by his son, found it was an edible, after 1-2 hours pt started feeling "funny", pt describes feeling like he had a panic attack, dizziness, +nausea and vomiting, (-) chest pain (-) sob

## 2025-02-24 NOTE — ED PROVIDER NOTE - NORMAL, MLM
Call made to patient and reviewed provider recommendations. Patient verbalized understanding.      derick all pertinent systems normal